# Patient Record
Sex: MALE | Race: WHITE | Employment: FULL TIME | URBAN - METROPOLITAN AREA
[De-identification: names, ages, dates, MRNs, and addresses within clinical notes are randomized per-mention and may not be internally consistent; named-entity substitution may affect disease eponyms.]

---

## 2018-03-21 ENCOUNTER — TELEPHONE (OUTPATIENT)
Dept: FAMILY MEDICINE CLINIC | Age: 33
End: 2018-03-21

## 2018-03-21 ENCOUNTER — OFFICE VISIT (OUTPATIENT)
Dept: FAMILY MEDICINE CLINIC | Age: 33
End: 2018-03-21

## 2018-03-21 VITALS
DIASTOLIC BLOOD PRESSURE: 66 MMHG | HEIGHT: 70 IN | WEIGHT: 183 LBS | BODY MASS INDEX: 26.2 KG/M2 | SYSTOLIC BLOOD PRESSURE: 122 MMHG

## 2018-03-21 DIAGNOSIS — R73.01 IFG (IMPAIRED FASTING GLUCOSE): ICD-10-CM

## 2018-03-21 DIAGNOSIS — L30.9 ECZEMA, UNSPECIFIED TYPE: Primary | ICD-10-CM

## 2018-03-21 DIAGNOSIS — E78.5 DYSLIPIDEMIA: ICD-10-CM

## 2018-03-21 DIAGNOSIS — R53.83 FATIGUE, UNSPECIFIED TYPE: ICD-10-CM

## 2018-03-21 PROCEDURE — 99213 OFFICE O/P EST LOW 20 MIN: CPT | Performed by: FAMILY MEDICINE

## 2018-03-21 PROCEDURE — 96372 THER/PROPH/DIAG INJ SC/IM: CPT | Performed by: FAMILY MEDICINE

## 2018-03-21 RX ORDER — METHYLPREDNISOLONE 4 MG/1
TABLET ORAL
Qty: 1 KIT | Refills: 0 | Status: SHIPPED | OUTPATIENT
Start: 2018-03-21 | End: 2018-03-27

## 2018-03-21 RX ORDER — DEXAMETHASONE SODIUM PHOSPHATE 4 MG/ML
4 INJECTION, SOLUTION INTRA-ARTICULAR; INTRALESIONAL; INTRAMUSCULAR; INTRAVENOUS; SOFT TISSUE ONCE
Status: COMPLETED | OUTPATIENT
Start: 2018-03-21 | End: 2018-03-21

## 2018-03-21 RX ADMIN — DEXAMETHASONE SODIUM PHOSPHATE 4 MG: 4 INJECTION, SOLUTION INTRA-ARTICULAR; INTRALESIONAL; INTRAMUSCULAR; INTRAVENOUS; SOFT TISSUE at 15:23

## 2018-03-21 ASSESSMENT — ENCOUNTER SYMPTOMS
CONSTIPATION: 0
DOUBLE VISION: 0
BLOOD IN STOOL: 0
HEARTBURN: 0
HEMOPTYSIS: 0
STRIDOR: 0
EYES NEGATIVE: 1
BLURRED VISION: 0
NAUSEA: 0
PHOTOPHOBIA: 0
EYE REDNESS: 0
SINUS PAIN: 0
BACK PAIN: 0
SPUTUM PRODUCTION: 0
WHEEZING: 0
GASTROINTESTINAL NEGATIVE: 1
ORTHOPNEA: 0
RESPIRATORY NEGATIVE: 1
ABDOMINAL PAIN: 0
EYE DISCHARGE: 0

## 2018-03-22 ENCOUNTER — NURSE ONLY (OUTPATIENT)
Dept: FAMILY MEDICINE CLINIC | Age: 33
End: 2018-03-22
Payer: COMMERCIAL

## 2018-03-22 ENCOUNTER — HOSPITAL ENCOUNTER (OUTPATIENT)
Age: 33
Discharge: HOME | End: 2018-03-24
Payer: COMMERCIAL

## 2018-03-22 DIAGNOSIS — Z87.898 HISTORY OF SYNCOPE: ICD-10-CM

## 2018-03-22 DIAGNOSIS — R73.01 IFG (IMPAIRED FASTING GLUCOSE): ICD-10-CM

## 2018-03-22 DIAGNOSIS — E78.5 DYSLIPIDEMIA: ICD-10-CM

## 2018-03-22 DIAGNOSIS — R53.83 FATIGUE, UNSPECIFIED TYPE: ICD-10-CM

## 2018-03-22 LAB
ALBUMIN SERPL-MCNC: 4.6 G/DL (ref 3.5–5.2)
ALP BLD-CCNC: 50 U/L (ref 40–129)
ALT SERPL-CCNC: 19 U/L (ref 0–40)
ANION GAP SERPL CALCULATED.3IONS-SCNC: 11 MMOL/L (ref 7–16)
AST SERPL-CCNC: 17 U/L (ref 0–39)
BASOPHILS ABSOLUTE: 0.04 E9/L (ref 0–0.2)
BASOPHILS RELATIVE PERCENT: 0.3 % (ref 0–2)
BILIRUB SERPL-MCNC: 0.5 MG/DL (ref 0–1.2)
BUN BLDV-MCNC: 12 MG/DL (ref 6–20)
CALCIUM SERPL-MCNC: 9.4 MG/DL (ref 8.6–10.2)
CHLORIDE BLD-SCNC: 96 MMOL/L (ref 98–107)
CHOLESTEROL, TOTAL: 189 MG/DL (ref 0–199)
CO2: 30 MMOL/L (ref 22–29)
CREAT SERPL-MCNC: 0.8 MG/DL (ref 0.7–1.2)
EOSINOPHILS ABSOLUTE: 0.07 E9/L (ref 0.05–0.5)
EOSINOPHILS RELATIVE PERCENT: 0.6 % (ref 0–6)
GFR AFRICAN AMERICAN: >60
GFR NON-AFRICAN AMERICAN: >60 ML/MIN/1.73
GLUCOSE BLD-MCNC: 83 MG/DL (ref 74–109)
HBA1C MFR BLD: 5.2 % (ref 4.8–5.9)
HCT VFR BLD CALC: 44.6 % (ref 37–54)
HDLC SERPL-MCNC: 58 MG/DL
HEMOGLOBIN: 14.9 G/DL (ref 12.5–16.5)
IMMATURE GRANULOCYTES #: 0.02 E9/L
IMMATURE GRANULOCYTES %: 0.2 % (ref 0–5)
LDL CHOLESTEROL CALCULATED: 116 MG/DL (ref 0–99)
LYMPHOCYTES ABSOLUTE: 2.48 E9/L (ref 1.5–4)
LYMPHOCYTES RELATIVE PERCENT: 19.5 % (ref 20–42)
MCH RBC QN AUTO: 30.2 PG (ref 26–35)
MCHC RBC AUTO-ENTMCNC: 33.4 % (ref 32–34.5)
MCV RBC AUTO: 90.5 FL (ref 80–99.9)
MONOCYTES ABSOLUTE: 0.78 E9/L (ref 0.1–0.95)
MONOCYTES RELATIVE PERCENT: 6.1 % (ref 2–12)
NEUTROPHILS ABSOLUTE: 9.31 E9/L (ref 1.8–7.3)
NEUTROPHILS RELATIVE PERCENT: 73.3 % (ref 43–80)
PDW BLD-RTO: 12.6 FL (ref 11.5–15)
PLATELET # BLD: 290 E9/L (ref 130–450)
PMV BLD AUTO: 11.3 FL (ref 7–12)
POTASSIUM SERPL-SCNC: 3.6 MMOL/L (ref 3.5–5)
RBC # BLD: 4.93 E12/L (ref 3.8–5.8)
SODIUM BLD-SCNC: 137 MMOL/L (ref 132–146)
TOTAL PROTEIN: 7.7 G/DL (ref 6.4–8.3)
TRIGL SERPL-MCNC: 77 MG/DL (ref 0–149)
TSH SERPL DL<=0.05 MIU/L-ACNC: 0.79 UIU/ML (ref 0.27–4.2)
VLDLC SERPL CALC-MCNC: 15 MG/DL
WBC # BLD: 12.7 E9/L (ref 4.5–11.5)

## 2018-03-22 PROCEDURE — 80053 COMPREHEN METABOLIC PANEL: CPT

## 2018-03-22 PROCEDURE — 83036 HEMOGLOBIN GLYCOSYLATED A1C: CPT

## 2018-03-22 PROCEDURE — 36415 COLL VENOUS BLD VENIPUNCTURE: CPT | Performed by: FAMILY MEDICINE

## 2018-03-22 PROCEDURE — 84443 ASSAY THYROID STIM HORMONE: CPT

## 2018-03-22 PROCEDURE — 85025 COMPLETE CBC W/AUTO DIFF WBC: CPT

## 2018-03-22 PROCEDURE — 80061 LIPID PANEL: CPT

## 2018-03-22 ASSESSMENT — PATIENT HEALTH QUESTIONNAIRE - PHQ9
2. FEELING DOWN, DEPRESSED OR HOPELESS: 0
1. LITTLE INTEREST OR PLEASURE IN DOING THINGS: 0
SUM OF ALL RESPONSES TO PHQ QUESTIONS 1-9: 0
SUM OF ALL RESPONSES TO PHQ9 QUESTIONS 1 & 2: 0

## 2018-03-22 ASSESSMENT — ENCOUNTER SYMPTOMS
TROUBLE SWALLOWING: 0
VISUAL CHANGE: 0

## 2018-04-18 ENCOUNTER — OFFICE VISIT (OUTPATIENT)
Dept: FAMILY MEDICINE CLINIC | Age: 33
End: 2018-04-18
Payer: COMMERCIAL

## 2018-04-18 VITALS
HEART RATE: 76 BPM | BODY MASS INDEX: 26.2 KG/M2 | SYSTOLIC BLOOD PRESSURE: 116 MMHG | DIASTOLIC BLOOD PRESSURE: 72 MMHG | OXYGEN SATURATION: 98 % | HEIGHT: 70 IN | WEIGHT: 183 LBS

## 2018-04-18 DIAGNOSIS — L30.9 ECZEMA, UNSPECIFIED TYPE: ICD-10-CM

## 2018-04-18 DIAGNOSIS — M67.40 GANGLION: ICD-10-CM

## 2018-04-18 DIAGNOSIS — E78.5 DYSLIPIDEMIA: Primary | ICD-10-CM

## 2018-04-18 PROCEDURE — 99213 OFFICE O/P EST LOW 20 MIN: CPT | Performed by: FAMILY MEDICINE

## 2018-04-18 ASSESSMENT — ENCOUNTER SYMPTOMS
SORE THROAT: 0
BLOOD IN STOOL: 0
COUGH: 0
BLURRED VISION: 0
HEARTBURN: 0
DOUBLE VISION: 0
SPUTUM PRODUCTION: 0
GASTROINTESTINAL NEGATIVE: 1
SINUS PAIN: 0
ABDOMINAL PAIN: 0
DIARRHEA: 0
RESPIRATORY NEGATIVE: 1
SHORTNESS OF BREATH: 0
EYES NEGATIVE: 1
EYE PAIN: 0
NAUSEA: 0
CONSTIPATION: 0
VOMITING: 0
HEMOPTYSIS: 0
BACK PAIN: 0
STRIDOR: 0
EYE DISCHARGE: 0
EYE REDNESS: 0
WHEEZING: 0
PHOTOPHOBIA: 0
ORTHOPNEA: 0

## 2021-04-01 ENCOUNTER — IMMUNIZATION (OUTPATIENT)
Dept: PRIMARY CARE CLINIC | Age: 36
End: 2021-04-01
Payer: COMMERCIAL

## 2021-04-01 PROCEDURE — 0011A COVID-19, MODERNA VACCINE 100MCG/0.5ML DOSE: CPT | Performed by: NURSE PRACTITIONER

## 2021-04-01 PROCEDURE — 91301 COVID-19, MODERNA VACCINE 100MCG/0.5ML DOSE: CPT | Performed by: NURSE PRACTITIONER

## 2021-04-29 ENCOUNTER — IMMUNIZATION (OUTPATIENT)
Dept: PRIMARY CARE CLINIC | Age: 36
End: 2021-04-29
Payer: COMMERCIAL

## 2021-04-29 PROCEDURE — 91301 COVID-19, MODERNA VACCINE 100MCG/0.5ML DOSE: CPT | Performed by: NURSE PRACTITIONER

## 2021-04-29 PROCEDURE — 0012A COVID-19, MODERNA VACCINE 100MCG/0.5ML DOSE: CPT | Performed by: NURSE PRACTITIONER

## 2021-09-30 ENCOUNTER — NURSE TRIAGE (OUTPATIENT)
Dept: OTHER | Facility: CLINIC | Age: 36
End: 2021-09-30

## 2021-09-30 NOTE — TELEPHONE ENCOUNTER
Reason for Disposition   MODERATE-SEVERE eyelid swelling on one side  (Exception: due to a mosquito bite)    Answer Assessment - Initial Assessment Questions  1. ONSET: \"When did the swelling start? \" (e.g., minutes, hours, days)      Yesterday around 7 PM, felt itchy and looked in the mirror and saw what was going on    2. LOCATION: \"What part of the eyelids is swollen? \"      Upper right eyelid     3. SEVERITY: \"How swollen is it? \"      Puffy, not swollen shut    4. ITCHING: \"Is there any itching? \" If so, ask: \"How much? \"   (Scale 1-10; mild, moderate or severe)      Yes ,slightly itchy    5. PAIN: \"Is the swelling painful to touch? \" If so, ask: \"How painful is it? \"   (Scale 1-10; mild, moderate or severe)      Yes, but not bad pain,  2/10      6. FEVER: \"Do you have a fever? \" If so, ask: \"What is it, how was it measured, and when did it start? \"       Denies    7. CAUSE: \"What do you think is causing the swelling? \"      I don't know    8. RECURRENT SYMPTOM: \"Have you had eyelid swelling before? \" If so, ask: \"When was the last time? \" \"What happened that time? \"      No    9. OTHER SYMPTOMS: \"Do you have any other symptoms? \" (e.g., blurred vision, eye discharge, rash, runny nose)      Denies blurred, discharge, rash and runny nose    10. PREGNANCY: \"Is there any chance you are pregnant? \" \"When was your last menstrual period? \"        No        Wears contacts, but wearing glasses today. Protocols used: EYE - SWELLING-ADULT-AH    Received call from [address] at [facility] with Red Flag Complaint. Brief description of triage: upper right eyelid puffy since yesterday    Triage indicates for patient to be seen with in 24 hours    Care advice provided, patient verbalizes understanding; denies any other questions or concerns; instructed to call back for any new or worsening symptoms. Writer provided warm transfer to [facility] for appointment scheduling. Attention Provider:   Thank you for allowing me to participate in the care of your patient. The patient was connected to triage in response to information provided to the E[facility]. Please do not respond through this encounter as the response is not directed to a shared pool.

## 2021-10-01 ENCOUNTER — OFFICE VISIT (OUTPATIENT)
Dept: FAMILY MEDICINE CLINIC | Age: 36
End: 2021-10-01
Payer: COMMERCIAL

## 2021-10-01 VITALS
SYSTOLIC BLOOD PRESSURE: 120 MMHG | RESPIRATION RATE: 16 BRPM | BODY MASS INDEX: 27.35 KG/M2 | WEIGHT: 191 LBS | OXYGEN SATURATION: 98 % | TEMPERATURE: 97.5 F | DIASTOLIC BLOOD PRESSURE: 74 MMHG | HEART RATE: 53 BPM | HEIGHT: 70 IN

## 2021-10-01 DIAGNOSIS — M79.672 LEFT FOOT PAIN: ICD-10-CM

## 2021-10-01 DIAGNOSIS — Z00.00 PHYSICAL EXAM: ICD-10-CM

## 2021-10-01 DIAGNOSIS — H10.9 CONJUNCTIVITIS OF RIGHT EYE, UNSPECIFIED CONJUNCTIVITIS TYPE: Primary | ICD-10-CM

## 2021-10-01 PROCEDURE — 99214 OFFICE O/P EST MOD 30 MIN: CPT | Performed by: FAMILY MEDICINE

## 2021-10-01 RX ORDER — TOBRAMYCIN AND DEXAMETHASONE 3; 1 MG/ML; MG/ML
1 SUSPENSION/ DROPS OPHTHALMIC
Qty: 5 ML | Refills: 0 | Status: SHIPPED | OUTPATIENT
Start: 2021-10-01 | End: 2021-10-21

## 2021-10-01 ASSESSMENT — PATIENT HEALTH QUESTIONNAIRE - PHQ9
SUM OF ALL RESPONSES TO PHQ QUESTIONS 1-9: 0
SUM OF ALL RESPONSES TO PHQ QUESTIONS 1-9: 0
2. FEELING DOWN, DEPRESSED OR HOPELESS: 0
SUM OF ALL RESPONSES TO PHQ9 QUESTIONS 1 & 2: 0
1. LITTLE INTEREST OR PLEASURE IN DOING THINGS: 0
SUM OF ALL RESPONSES TO PHQ QUESTIONS 1-9: 0

## 2021-10-07 PROBLEM — M79.672 LEFT FOOT PAIN: Status: ACTIVE | Noted: 2021-10-07

## 2021-10-07 PROBLEM — H10.9 CONJUNCTIVITIS OF RIGHT EYE: Status: ACTIVE | Noted: 2021-10-07

## 2021-10-07 ASSESSMENT — ENCOUNTER SYMPTOMS
EYE REDNESS: 0
CHEST TIGHTNESS: 0
SORE THROAT: 0
COLOR CHANGE: 0
VOICE CHANGE: 0
NAUSEA: 0
GASTROINTESTINAL NEGATIVE: 1
BLOOD IN STOOL: 0
RECTAL PAIN: 0
CHOKING: 0
SHORTNESS OF BREATH: 0
DIARRHEA: 0
BACK PAIN: 0
EYE DISCHARGE: 0
STRIDOR: 0
PHOTOPHOBIA: 0
SINUS PRESSURE: 0
CONSTIPATION: 0
FACIAL SWELLING: 0
EYE ITCHING: 1
RHINORRHEA: 0
WHEEZING: 0
VOMITING: 0
ANAL BLEEDING: 0
EYE PAIN: 0
COUGH: 0
RESPIRATORY NEGATIVE: 1
TROUBLE SWALLOWING: 0
SINUS PAIN: 0
ABDOMINAL PAIN: 0
ABDOMINAL DISTENTION: 0

## 2021-10-07 NOTE — PROGRESS NOTES
SUBJECTIVE  Yolanda Garsia is a 43 y.o. male. HPI/Chief C/O:  Chief Complaint   Patient presents with    Eye Problem     Right eye is swollen x 2 days. \"A little itchy. \" No discharge from eye.  Foot Pain     Left foot pain x 2-3 months. No injury recalled. No Known Allergies    This 43year old male presents for physical exam. Pt has alin. Eyelid swelling with itchy eyes for 2 days. Pt states vision is good. Pt c/o left foot pain, denies injury, for 3 months. ROS:  Review of Systems   Constitutional: Positive for fatigue. Negative for activity change, appetite change, chills, diaphoresis, fever and unexpected weight change. HENT: Negative for congestion, dental problem, drooling, ear discharge, ear pain, facial swelling, hearing loss, mouth sores, nosebleeds, postnasal drip, rhinorrhea, sinus pressure, sinus pain, sneezing, sore throat, tinnitus, trouble swallowing and voice change. Eyes: Positive for itching. Negative for photophobia, pain, discharge, redness and visual disturbance. Respiratory: Negative. Negative for cough, choking, chest tightness, shortness of breath, wheezing and stridor. Cardiovascular: Negative for chest pain, palpitations and leg swelling. Gastrointestinal: Negative. Negative for abdominal distention, abdominal pain, anal bleeding, blood in stool, constipation, diarrhea, nausea, rectal pain and vomiting. Endocrine: Negative. Negative for cold intolerance, heat intolerance, polydipsia, polyphagia and polyuria. Genitourinary: Negative. Negative for decreased urine volume, difficulty urinating, dysuria, flank pain, frequency, hematuria and urgency. Musculoskeletal: Positive for arthralgias and myalgias. Negative for back pain, gait problem, joint swelling, neck pain and neck stiffness. Skin: Negative. Negative for color change, pallor, rash and wound. Neurological: Negative.   Negative for dizziness, tremors, seizures, syncope, facial asymmetry, speech rhythm. Pulses: Normal pulses. Heart sounds: Normal heart sounds. No murmur heard. No friction rub. No gallop. Pulmonary:      Effort: Pulmonary effort is normal. No respiratory distress. Breath sounds: Normal breath sounds. No stridor. No wheezing, rhonchi or rales. Chest:      Chest wall: No tenderness. Abdominal:      General: Bowel sounds are normal. There is no distension. Palpations: Abdomen is soft. There is no mass. Tenderness: There is no abdominal tenderness. There is no right CVA tenderness, left CVA tenderness, guarding or rebound. Hernia: No hernia is present. Musculoskeletal:         General: Tenderness present. No swelling, deformity or signs of injury. Cervical back: Normal range of motion and neck supple. No rigidity or tenderness. No muscular tenderness. Right lower leg: No edema. Left lower leg: No edema. Comments: Pain and decreased ROM left foot. Lymphadenopathy:      Cervical: No cervical adenopathy. Skin:     General: Skin is warm. Coloration: Skin is not jaundiced or pale. Findings: No bruising, erythema, lesion or rash. Neurological:      General: No focal deficit present. Mental Status: He is alert and oriented to person, place, and time. Cranial Nerves: No cranial nerve deficit. Sensory: No sensory deficit. Motor: No weakness or abnormal muscle tone. Coordination: Coordination normal.      Gait: Gait normal.      Deep Tendon Reflexes: Reflexes are normal and symmetric. Reflexes normal.   Psychiatric:         Mood and Affect: Mood normal.         Behavior: Behavior normal.         Thought Content: Thought content normal.         Judgment: Judgment normal.         ASSESSMENT/PLAN  Allyson Valdes was seen today for eye problem and foot pain. Diagnoses and all orders for this visit:    Conjunctivitis of right eye, unspecified conjunctivitis type  Not controlled. tobradex ophthalmic.  Pt

## 2021-12-03 ENCOUNTER — HOSPITAL ENCOUNTER (EMERGENCY)
Age: 36
Discharge: HOME | End: 2021-12-03
Payer: COMMERCIAL

## 2021-12-03 VITALS
DIASTOLIC BLOOD PRESSURE: 76 MMHG | HEART RATE: 63 BPM | RESPIRATION RATE: 14 BRPM | SYSTOLIC BLOOD PRESSURE: 129 MMHG | TEMPERATURE: 97.5 F | OXYGEN SATURATION: 100 %

## 2021-12-03 DIAGNOSIS — S61.219A LACERATION OF FINGER WITHOUT FOREIGN BODY WITHOUT DAMAGE TO NAIL, UNSPECIFIED FINGER, UNSPECIFIED LATERALITY, INITIAL ENCOUNTER: Primary | ICD-10-CM

## 2021-12-03 PROCEDURE — 99211 OFF/OP EST MAY X REQ PHY/QHP: CPT

## 2021-12-03 PROCEDURE — 12001 RPR S/N/AX/GEN/TRNK 2.5CM/<: CPT

## 2021-12-03 ASSESSMENT — PAIN DESCRIPTION - LOCATION: LOCATION: FINGER (COMMENT WHICH ONE)

## 2021-12-03 ASSESSMENT — PAIN SCALES - GENERAL: PAINLEVEL_OUTOF10: 6

## 2021-12-03 ASSESSMENT — PAIN DESCRIPTION - ORIENTATION: ORIENTATION: LEFT

## 2021-12-03 NOTE — ED PROVIDER NOTES
Department of Emergency Medicine  ED Provider Note  Admit Date: 12/3/2021  Room: 06/06            HPI:  12/3/21, Time: 3:53 PM SONIA Richards is a 43 y.o. old male presenting to the emergency department for a laceration to the left index finger. He said this happened at home when he was using a knife to cut some type of material that he was using to build a bathroom in his basement. He said it happened just prior to arrival he has no other complaints  Review of Systems:   Pertinent positives and negatives are stated within HPI, all other systems reviewed and are negative.          --------------------------------------------- PAST HISTORY ---------------------------------------------  Past Medical History:  has a past medical history of Syncope. Past Surgical History:  has no past surgical history on file. Social History:  reports that he has never smoked. He has never used smokeless tobacco.    Family History: family history is not on file. The patients home medications have been reviewed. Allergies: Patient has no known allergies. -------------------------------------------------- RESULTS -------------------------------------------------  All laboratory and radiology results have been personally reviewed by myself   LABS:  No results found for this visit on 12/03/21. RADIOLOGY:  Interpreted by Radiologist.  No orders to display       ------------------------- NURSING NOTES AND VITALS REVIEWED ---------------------------   The nursing notes within the ED encounter and vital signs as below have been reviewed.    /76   Pulse 63   Temp 97.5 °F (36.4 °C)   Resp 14   SpO2 100%   Oxygen Saturation Interpretation: Normal      ---------------------------------------------------PHYSICAL EXAM--------------------------------------      Constitutional/General: Alert and oriented x3, well appearing, non toxic in NAD  Head: Normocephalic and atraumatic  Eyes: clear conjunctiva  Mouth:  handling secretions, no trismus  Neck: Supple, full ROM,   Pulmonary: . Not in respiratory distress  Cardiovascular:  Regular rate and rhythm,   Extremities: Moves all extremities x 4. Warm and well perfused  Skin: warm and dry without rash. There is a laceration of the left index finger, which measures 1 cm. It is a superficial flap type laceration on the distal part of the finger   neurologic: GCS 15,  Psych: Normal Affect      ------------------------------ ED COURSE/MEDICAL DECISION MAKING----------------------  Medications - No data to display          LACERATION REPAIR  PROCEDURE NOTE:      Medical Decision Making: This is a superficial laceration on the distal part of the finger. It is more of a flap type laceration. I did irrigate it with normal saline. I did close it with skin adhesive. He was advised to follow-up with his PCP. He thinks his tetanus shot is within the last 5 years he did not want one today he will check his records at home and follow-up with his doctor if he needs one. Counseling: The emergency provider has spoken with the patient and discussed todays results, in addition to providing specific details for the plan of care and counseling regarding the diagnosis and prognosis. Questions are answered at this time and they are agreeable with the plan.      --------------------------------- IMPRESSION AND DISPOSITION ---------------------------------    IMPRESSION  1.  Laceration of finger without foreign body without damage to nail, unspecified finger, unspecified laterality, initial encounter        DISPOSITION  Disposition: Discharge to home  Patient condition is good         RICKY Raphael - CNP  12/03/21 [address] - CNP  12/03/21 [id number]

## 2025-01-22 ENCOUNTER — OFFICE VISIT (OUTPATIENT)
Dept: FAMILY MEDICINE CLINIC | Age: 40
End: 2025-01-22
Payer: COMMERCIAL

## 2025-01-22 VITALS
SYSTOLIC BLOOD PRESSURE: 110 MMHG | OXYGEN SATURATION: 97 % | HEIGHT: 70 IN | DIASTOLIC BLOOD PRESSURE: 72 MMHG | HEART RATE: 56 BPM | BODY MASS INDEX: 28.77 KG/M2 | WEIGHT: 201 LBS | TEMPERATURE: 97.7 F | RESPIRATION RATE: 17 BRPM

## 2025-01-22 DIAGNOSIS — M54.32 SCIATICA OF LEFT SIDE: Primary | ICD-10-CM

## 2025-01-22 PROCEDURE — 99204 OFFICE O/P NEW MOD 45 MIN: CPT | Performed by: PHYSICIAN ASSISTANT

## 2025-01-22 PROCEDURE — 96372 THER/PROPH/DIAG INJ SC/IM: CPT | Performed by: PHYSICIAN ASSISTANT

## 2025-01-22 RX ORDER — PREDNISONE 10 MG/1
TABLET ORAL
Qty: 18 TABLET | Refills: 0 | Status: SHIPPED | OUTPATIENT
Start: 2025-01-22

## 2025-01-22 RX ORDER — METHYLPREDNISOLONE ACETATE 80 MG/ML
80 INJECTION, SUSPENSION INTRA-ARTICULAR; INTRALESIONAL; INTRAMUSCULAR; SOFT TISSUE ONCE
Status: COMPLETED | OUTPATIENT
Start: 2025-01-22 | End: 2025-01-22

## 2025-01-22 RX ADMIN — METHYLPREDNISOLONE ACETATE 80 MG: 80 INJECTION, SUSPENSION INTRA-ARTICULAR; INTRALESIONAL; INTRAMUSCULAR; SOFT TISSUE at 09:24

## 2025-01-22 NOTE — PROGRESS NOTES
25  Broderick Pfeiffer : 1985 Sex: male  Age 39 y.o.      Subjective:  Chief Complaint   Patient presents with    Leg Pain     Pain down left leg         HPI:     History of Present Illness  The patient presents for evaluation of knee pain.    He reports experiencing sharp, localized pain in his knee, which radiates into his calf muscle and extends up to his quadriceps. This pain is distinct from the usual muscle tension he experiences and was severe enough to disrupt his sleep at 1 AM. Despite attempts to alleviate the discomfort through the use of a tennis ball and a TENS unit, the pain persisted for approximately 3 hours. He describes the pain as being particularly intense under the kneecap. He reports no recent falls or injuries and does not experience any associated back pain. His wife observed no swelling in the affected area this morning. He reports no history of sciatica or blood clots in the legs or chest. He also reports no urinary or bowel incontinence, maintains good sensation in the groin area, and does not experience any dysuria.    Supplemental Information  He has a history of low back pain due to arthritis, for which he occasionally seeks chiropractic care. 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            ROS:   Unless otherwise stated in this report the patient's positive and negative responses for review of systems for constitutional, eyes, ENT, cardiovascular, respiratory, gastrointestinal, neurological, , musculoskeletal, and integument systems and related systems to the presenting problem are either stated in the history of present illness or were not pertinent or were negative for the symptoms and/or complaints related to the presenting medical problem.  Positives and pertinent negatives as per HPI.  All others reviewed and are negative.      PMH:     Past Medical

## 2025-02-04 ENCOUNTER — OFFICE VISIT (OUTPATIENT)
Dept: PRIMARY CARE CLINIC | Age: 40
End: 2025-02-04
Payer: COMMERCIAL

## 2025-02-04 VITALS
BODY MASS INDEX: 28.49 KG/M2 | DIASTOLIC BLOOD PRESSURE: 76 MMHG | WEIGHT: 199 LBS | HEIGHT: 70 IN | TEMPERATURE: 97.6 F | OXYGEN SATURATION: 95 % | HEART RATE: 58 BPM | SYSTOLIC BLOOD PRESSURE: 128 MMHG

## 2025-02-04 DIAGNOSIS — E66.3 OVERWEIGHT WITH BODY MASS INDEX (BMI) OF 28 TO 28.9 IN ADULT: ICD-10-CM

## 2025-02-04 DIAGNOSIS — Z13.6 SCREENING FOR CARDIOVASCULAR CONDITION: ICD-10-CM

## 2025-02-04 DIAGNOSIS — Z00.00 WELLNESS EXAMINATION: Primary | ICD-10-CM

## 2025-02-04 DIAGNOSIS — E78.5 DYSLIPIDEMIA: ICD-10-CM

## 2025-02-04 DIAGNOSIS — R53.83 OTHER FATIGUE: ICD-10-CM

## 2025-02-04 PROBLEM — M79.672 LEFT FOOT PAIN: Status: RESOLVED | Noted: 2021-10-07 | Resolved: 2025-02-04

## 2025-02-04 PROBLEM — H10.9 CONJUNCTIVITIS OF RIGHT EYE: Status: RESOLVED | Noted: 2021-10-07 | Resolved: 2025-02-04

## 2025-02-04 PROBLEM — M67.40 GANGLION: Status: RESOLVED | Noted: 2018-04-18 | Resolved: 2025-02-04

## 2025-02-04 PROCEDURE — 99396 PREV VISIT EST AGE 40-64: CPT | Performed by: STUDENT IN AN ORGANIZED HEALTH CARE EDUCATION/TRAINING PROGRAM

## 2025-02-04 SDOH — ECONOMIC STABILITY: FOOD INSECURITY: WITHIN THE PAST 12 MONTHS, YOU WORRIED THAT YOUR FOOD WOULD RUN OUT BEFORE YOU GOT MONEY TO BUY MORE.: NEVER TRUE

## 2025-02-04 SDOH — ECONOMIC STABILITY: FOOD INSECURITY: WITHIN THE PAST 12 MONTHS, THE FOOD YOU BOUGHT JUST DIDN'T LAST AND YOU DIDN'T HAVE MONEY TO GET MORE.: NEVER TRUE

## 2025-02-04 ASSESSMENT — ENCOUNTER SYMPTOMS
SHORTNESS OF BREATH: 0
WHEEZING: 0
VOMITING: 0
NAUSEA: 0
DIARRHEA: 0
ABDOMINAL PAIN: 0
COUGH: 0

## 2025-02-04 ASSESSMENT — PATIENT HEALTH QUESTIONNAIRE - PHQ9
2. FEELING DOWN, DEPRESSED OR HOPELESS: NOT AT ALL
SUM OF ALL RESPONSES TO PHQ QUESTIONS 1-9: 0
1. LITTLE INTEREST OR PLEASURE IN DOING THINGS: NOT AT ALL
SUM OF ALL RESPONSES TO PHQ QUESTIONS 1-9: 0
SUM OF ALL RESPONSES TO PHQ9 QUESTIONS 1 & 2: 0

## 2025-02-04 NOTE — PROGRESS NOTES
entered as intended.          An electronic signature was used to authenticate this note.    --Maritza Zuniga, DO

## 2025-02-04 NOTE — ASSESSMENT & PLAN NOTE
Ordered labwork     Orders:    CBC with Auto Differential; Future    Comprehensive Metabolic Panel; Future    Hemoglobin A1C; Future    TSH; Future    Lipid Panel; Future

## 2025-02-05 DIAGNOSIS — R53.83 OTHER FATIGUE: ICD-10-CM

## 2025-02-05 DIAGNOSIS — Z00.00 WELLNESS EXAMINATION: ICD-10-CM

## 2025-02-05 DIAGNOSIS — E78.5 DYSLIPIDEMIA: ICD-10-CM

## 2025-02-05 DIAGNOSIS — Z13.6 SCREENING FOR CARDIOVASCULAR CONDITION: ICD-10-CM

## 2025-02-05 DIAGNOSIS — E66.3 OVERWEIGHT WITH BODY MASS INDEX (BMI) OF 28 TO 28.9 IN ADULT: ICD-10-CM

## 2025-02-05 LAB
ALBUMIN: 4.1 G/DL (ref 3.5–5.2)
ALP BLD-CCNC: 56 U/L (ref 40–129)
ALT SERPL-CCNC: 13 U/L (ref 0–40)
ANION GAP SERPL CALCULATED.3IONS-SCNC: 16 MMOL/L (ref 7–16)
AST SERPL-CCNC: 18 U/L (ref 0–39)
BASOPHILS ABSOLUTE: 0.07 K/UL (ref 0–0.2)
BASOPHILS RELATIVE PERCENT: 1 % (ref 0–2)
BILIRUB SERPL-MCNC: 0.4 MG/DL (ref 0–1.2)
BUN BLDV-MCNC: 15 MG/DL (ref 6–20)
CALCIUM SERPL-MCNC: 9.3 MG/DL (ref 8.6–10.2)
CHLORIDE BLD-SCNC: 103 MMOL/L (ref 98–107)
CHOLESTEROL, TOTAL: 192 MG/DL
CO2: 22 MMOL/L (ref 22–29)
CREAT SERPL-MCNC: 0.9 MG/DL (ref 0.7–1.2)
EOSINOPHILS ABSOLUTE: 0.18 K/UL (ref 0.05–0.5)
EOSINOPHILS RELATIVE PERCENT: 3 % (ref 0–6)
FOLATE: 11.7 NG/ML (ref 4.8–24.2)
GFR, ESTIMATED: >90 ML/MIN/1.73M2
GLUCOSE BLD-MCNC: 86 MG/DL (ref 74–99)
HBA1C MFR BLD: 5.3 % (ref 4–5.6)
HCT VFR BLD CALC: 44.9 % (ref 37–54)
HDLC SERPL-MCNC: 41 MG/DL
HEMOGLOBIN: 14.6 G/DL (ref 12.5–16.5)
IMMATURE GRANULOCYTES %: 0 % (ref 0–5)
IMMATURE GRANULOCYTES ABSOLUTE: <0.03 K/UL (ref 0–0.58)
LDL CHOLESTEROL: 127 MG/DL
LYMPHOCYTES ABSOLUTE: 1.7 K/UL (ref 1.5–4)
LYMPHOCYTES RELATIVE PERCENT: 26 % (ref 20–42)
MCH RBC QN AUTO: 30.4 PG (ref 26–35)
MCHC RBC AUTO-ENTMCNC: 32.5 G/DL (ref 32–34.5)
MCV RBC AUTO: 93.3 FL (ref 80–99.9)
MONOCYTES ABSOLUTE: 0.47 K/UL (ref 0.1–0.95)
MONOCYTES RELATIVE PERCENT: 7 % (ref 2–12)
NEUTROPHILS ABSOLUTE: 4.2 K/UL (ref 1.8–7.3)
NEUTROPHILS RELATIVE PERCENT: 63 % (ref 43–80)
PDW BLD-RTO: 13.2 % (ref 11.5–15)
PLATELET # BLD: 291 K/UL (ref 130–450)
PMV BLD AUTO: 11 FL (ref 7–12)
POTASSIUM SERPL-SCNC: 4.5 MMOL/L (ref 3.5–5)
RBC # BLD: 4.81 M/UL (ref 3.8–5.8)
SODIUM BLD-SCNC: 141 MMOL/L (ref 132–146)
TOTAL PROTEIN: 6.9 G/DL (ref 6.4–8.3)
TRIGL SERPL-MCNC: 119 MG/DL
TSH SERPL DL<=0.05 MIU/L-ACNC: 1.54 UIU/ML (ref 0.27–4.2)
VITAMIN B-12: 574 PG/ML (ref 211–946)
VLDLC SERPL CALC-MCNC: 24 MG/DL
WBC # BLD: 6.6 K/UL (ref 4.5–11.5)

## 2025-02-06 DIAGNOSIS — Z79.899 LONG TERM USE OF DRUG: ICD-10-CM

## 2025-02-06 DIAGNOSIS — E78.5 DYSLIPIDEMIA: ICD-10-CM

## 2025-02-06 DIAGNOSIS — E66.3 OVERWEIGHT WITH BODY MASS INDEX (BMI) OF 28 TO 28.9 IN ADULT: Primary | ICD-10-CM

## 2025-02-06 LAB
6-MONOACETYLMORPHINE, URINE: NEGATIVE
ABNORMAL SPECIMEN VALIDITY TEST: ABNORMAL
ALCOHOL URINE: NOT DETECTED MG/DL
AMPHETAMINE SCREEN URINE: NEGATIVE
BARBITURATE SCREEN URINE: NEGATIVE
BENZODIAZEPINE SCREEN, URINE: NEGATIVE
BUPRENORPHINE URINE: NEGATIVE
CANNABINOID SCREEN URINE: NEGATIVE
COCAINE METABOLITE, URINE: NEGATIVE
FENTANYL URINE: NEGATIVE
INTEGRITY CHECK, CREATININE, URINE: 20.5 MG/DL (ref 22–250)
INTEGRITY CHECK, OXIDANT, URINE: 50 MG/L
INTEGRITY CHECK, PH, URINE: 7.1 (ref 4.5–9)
INTEGRITY CHECK, SPECIFIC GRAVITY, URINE: 1 (ref 1–1.03)
METHADONE SCREEN, URINE: NEGATIVE
OPIATES, URINE: NEGATIVE
OXYCODONE SCREEN URINE: NEGATIVE
PCP,URINE: NEGATIVE
SEX HORMONE BINDING GLOBULIN: 18 NMOL/L (ref 10–60)
TEST INFORMATION: ABNORMAL
TESTOSTERONE FREE-NONMALE: 89.9 PG/ML (ref 47–244)
TESTOSTERONE TOTAL: 334 NG/DL (ref 249–836)
TESTOSTERONE, BIOAVAILABLE: 210.6 NG/DL (ref 130–680)
TRAMADOL, URINE: NEGATIVE

## 2025-02-06 RX ORDER — PHENTERMINE HYDROCHLORIDE 37.5 MG/1
37.5 TABLET ORAL
Qty: 30 TABLET | Refills: 0 | Status: SHIPPED | OUTPATIENT
Start: 2025-02-06 | End: 2025-03-08

## 2025-02-28 DIAGNOSIS — E66.3 OVERWEIGHT WITH BODY MASS INDEX (BMI) OF 28 TO 28.9 IN ADULT: ICD-10-CM

## 2025-02-28 DIAGNOSIS — E78.5 DYSLIPIDEMIA: ICD-10-CM

## 2025-02-28 RX ORDER — PHENTERMINE HYDROCHLORIDE 37.5 MG/1
37.5 TABLET ORAL
Qty: 30 TABLET | Refills: 0 | Status: SHIPPED | OUTPATIENT
Start: 2025-03-04 | End: 2025-04-03

## 2025-02-28 NOTE — TELEPHONE ENCOUNTER
Name of Medication(s) Requested:  Requested Prescriptions     Pending Prescriptions Disp Refills    phentermine (ADIPEX-P) 37.5 MG tablet 30 tablet 0     Sig: Take 1 tablet by mouth every morning (before breakfast) for 30 days. Max Daily Amount: 37.5 mg       Medication is on current medication list Yes    Dosage and directions were verified? Yes    Quantity verified: 30 day supply     Pharmacy Verified?  Yes    Last Appointment:  2/4/2025    Future appts:  Future Appointments   Date Time Provider Department Center   3/13/2025  7:30 AM Maritza Zuniga DO [facility] DE[facility]        (If no appt send self scheduling link. .REFILLAPPT)  Scheduling request sent?     [] Yes  [] No    Does patient need updated?  [] Yes  [] No

## 2025-03-13 ENCOUNTER — OFFICE VISIT (OUTPATIENT)
Dept: PRIMARY CARE CLINIC | Age: 40
End: 2025-03-13
Payer: COMMERCIAL

## 2025-03-13 VITALS
TEMPERATURE: 97.7 F | WEIGHT: 188.6 LBS | BODY MASS INDEX: 27 KG/M2 | HEART RATE: 92 BPM | DIASTOLIC BLOOD PRESSURE: 82 MMHG | HEIGHT: 70 IN | OXYGEN SATURATION: 98 % | SYSTOLIC BLOOD PRESSURE: 112 MMHG

## 2025-03-13 DIAGNOSIS — E66.3 OVERWEIGHT (BMI 25.0-29.9): ICD-10-CM

## 2025-03-13 DIAGNOSIS — E78.5 DYSLIPIDEMIA: Primary | ICD-10-CM

## 2025-03-13 PROCEDURE — 99213 OFFICE O/P EST LOW 20 MIN: CPT | Performed by: STUDENT IN AN ORGANIZED HEALTH CARE EDUCATION/TRAINING PROGRAM

## 2025-03-13 ASSESSMENT — ENCOUNTER SYMPTOMS
VOMITING: 0
NAUSEA: 0
ABDOMINAL PAIN: 0
SHORTNESS OF BREATH: 0
DIARRHEA: 0
WHEEZING: 0
COUGH: 0

## 2025-03-13 NOTE — ASSESSMENT & PLAN NOTE
Patient has done very well on Adipex.  He is tolerated it well.  He has lost 10 pounds since starting the medication.  Continue current regimen and follow-up in 1 month.  Patient is aware of all the risk and benefits of this medication.  Sent medication to pharmacy.  Reviewed labs.  Patient also mention that he gets sick often and I recommended patient to consider taking vitamin C over-the-counter on a regular basis.

## 2025-03-13 NOTE — PROGRESS NOTES
Broderick Pfeiffer (:  1985) is a 39 y.o. male,Established patient, here for evaluation of the following chief complaint(s):  1 Month Follow-Up (Medication follow up)    Subjective     HPI  39-year-old male presents for follow-up of weight loss medication.  At previous visit in February we had lab work done and patient desired to go on a weight loss medication Adipex.  Patient has lost 10 pounds since starting Adipex.  At previous visit he was 199 pounds he is now 188 pounds. Pt has been little jittery on the medication but no other side effects.  He overall feels well on the medication  Review of Systems   Constitutional:  Negative for activity change, appetite change, fatigue and fever.   Respiratory:  Negative for cough, shortness of breath and wheezing.    Cardiovascular:  Negative for chest pain and palpitations.   Gastrointestinal:  Negative for abdominal pain, diarrhea, nausea and vomiting.   Skin:  Negative for pallor.   Neurological:  Negative for dizziness, weakness and headaches.          Objective    /82   Pulse 92   Temp 97.7 °F (36.5 °C)   Ht 1.765 m (5' 9.5\")   Wt 85.5 kg (188 lb 9.6 oz)   SpO2 98%   BMI 27.45 kg/m²   Physical Exam  Constitutional:       Appearance: He is well-developed and well-groomed.   HENT:      Right Ear: Tympanic membrane and ear canal normal. Tympanic membrane is not erythematous.      Left Ear: Tympanic membrane and ear canal normal. Tympanic membrane is not erythematous.      Mouth/Throat:      Mouth: Mucous membranes are moist.      Pharynx: Oropharynx is clear.   Eyes:      General: No scleral icterus.     Pupils: Pupils are equal, round, and reactive to light.   Cardiovascular:      Rate and Rhythm: Normal rate and regular rhythm.      Heart sounds: No murmur heard.  Pulmonary:      Effort: No retractions.      Breath sounds: No decreased breath sounds, wheezing or rhonchi.      Comments: CTAB, good air exchange  Musculoskeletal:      Right lower

## 2025-03-27 DIAGNOSIS — E78.5 DYSLIPIDEMIA: ICD-10-CM

## 2025-03-27 DIAGNOSIS — E66.3 OVERWEIGHT (BMI 25.0-29.9): Primary | ICD-10-CM

## 2025-03-27 RX ORDER — PHENTERMINE HYDROCHLORIDE 37.5 MG/1
37.5 CAPSULE ORAL EVERY MORNING
Qty: 30 CAPSULE | Refills: 0 | Status: SHIPPED | OUTPATIENT
Start: 2025-04-04 | End: 2025-05-04

## 2025-04-08 ENCOUNTER — OFFICE VISIT (OUTPATIENT)
Dept: PRIMARY CARE CLINIC | Age: 40
End: 2025-04-08
Payer: COMMERCIAL

## 2025-04-08 VITALS
RESPIRATION RATE: 16 BRPM | OXYGEN SATURATION: 98 % | DIASTOLIC BLOOD PRESSURE: 70 MMHG | SYSTOLIC BLOOD PRESSURE: 106 MMHG | WEIGHT: 186 LBS | HEART RATE: 91 BPM | HEIGHT: 70 IN | TEMPERATURE: 97.6 F | BODY MASS INDEX: 26.63 KG/M2

## 2025-04-08 DIAGNOSIS — E66.3 OVERWEIGHT (BMI 25.0-29.9): Primary | ICD-10-CM

## 2025-04-08 DIAGNOSIS — E78.5 DYSLIPIDEMIA: ICD-10-CM

## 2025-04-08 PROCEDURE — 99213 OFFICE O/P EST LOW 20 MIN: CPT | Performed by: STUDENT IN AN ORGANIZED HEALTH CARE EDUCATION/TRAINING PROGRAM

## 2025-04-08 SDOH — ECONOMIC STABILITY: FOOD INSECURITY: WITHIN THE PAST 12 MONTHS, YOU WORRIED THAT YOUR FOOD WOULD RUN OUT BEFORE YOU GOT MONEY TO BUY MORE.: NEVER TRUE

## 2025-04-08 SDOH — ECONOMIC STABILITY: INCOME INSECURITY: IN THE LAST 12 MONTHS, WAS THERE A TIME WHEN YOU WERE NOT ABLE TO PAY THE MORTGAGE OR RENT ON TIME?: NO

## 2025-04-08 SDOH — ECONOMIC STABILITY: FOOD INSECURITY: WITHIN THE PAST 12 MONTHS, THE FOOD YOU BOUGHT JUST DIDN'T LAST AND YOU DIDN'T HAVE MONEY TO GET MORE.: NEVER TRUE

## 2025-04-08 SDOH — ECONOMIC STABILITY: TRANSPORTATION INSECURITY
IN THE PAST 12 MONTHS, HAS THE LACK OF TRANSPORTATION KEPT YOU FROM MEDICAL APPOINTMENTS OR FROM GETTING MEDICATIONS?: NO

## 2025-04-08 SDOH — ECONOMIC STABILITY: TRANSPORTATION INSECURITY
IN THE PAST 12 MONTHS, HAS LACK OF TRANSPORTATION KEPT YOU FROM MEETINGS, WORK, OR FROM GETTING THINGS NEEDED FOR DAILY LIVING?: NO

## 2025-04-08 ASSESSMENT — ENCOUNTER SYMPTOMS
NAUSEA: 0
COUGH: 0
VOMITING: 0
SHORTNESS OF BREATH: 0
ABDOMINAL PAIN: 0
DIARRHEA: 0
WHEEZING: 0

## 2025-04-08 NOTE — PROGRESS NOTES
attempts have been made to ensure accuracy, certain words or phrases may not be entered as intended.          An electronic signature was used to authenticate this note.    --Maritza Zuniga, DO

## 2025-04-08 NOTE — ASSESSMENT & PLAN NOTE
Patient did not lose as much weight this past month on Adipex however overall he is lost a decent mount of weight on Adipex.  Discussed with him about incorporating at least 15 minutes of physical activity a day.  Did offer Wellbutrin to take to help maintain his weight loss however patient will want to try it on his own he can call the office or contact me via [employer] if he ever decides to go on that medication.  Will have him follow-up in 6 months to restart Adipex if he ever desires in the future.  Prescription of Adipex was sent already to pharmacy patient will contact pharmacy to get the medication if any issues he will contact our office.

## 2025-05-30 ENCOUNTER — OFFICE VISIT (OUTPATIENT)
Dept: PODIATRY | Age: 40
End: 2025-05-30
Payer: COMMERCIAL

## 2025-05-30 VITALS — HEIGHT: 69 IN | BODY MASS INDEX: 27.55 KG/M2 | WEIGHT: 186 LBS

## 2025-05-30 DIAGNOSIS — M72.2 PLANTAR FASCIITIS: ICD-10-CM

## 2025-05-30 DIAGNOSIS — Q66.6 PES VALGUS: Primary | ICD-10-CM

## 2025-05-30 DIAGNOSIS — R26.2 DIFFICULTY WALKING: ICD-10-CM

## 2025-05-30 DIAGNOSIS — L85.3 XEROSIS CUTIS: ICD-10-CM

## 2025-05-30 PROCEDURE — 1036F TOBACCO NON-USER: CPT | Performed by: PODIATRIST

## 2025-05-30 PROCEDURE — 99203 OFFICE O/P NEW LOW 30 MIN: CPT | Performed by: PODIATRIST

## 2025-05-30 PROCEDURE — G8427 DOCREV CUR MEDS BY ELIG CLIN: HCPCS | Performed by: PODIATRIST

## 2025-05-30 PROCEDURE — G8419 CALC BMI OUT NRM PARAM NOF/U: HCPCS | Performed by: PODIATRIST

## 2025-05-30 RX ORDER — AMMONIUM LACTATE 12 G/100G
CREAM TOPICAL
Qty: 385 G | Refills: 4 | Status: SHIPPED | OUTPATIENT
Start: 2025-05-30

## 2025-05-30 NOTE — PROGRESS NOTES
Patient is in today for evaluation of bilateral foot pain. Patient says he wakes up in the mornings and has pain. Patient says he uses either an ace wrap or compression socks and it does help elevate the pain. Pcp is Maritza Zuniga,   Last ov 4/8/25  
Comment: sometimes less    Drug use: Never           Diagnostic studies:    No results found.      Procedures:    None    Exam:  VASCULAR: Pedal pulses palpable bilateral foot.  Capillary refill time brisk bilateral foot.  Hair growth present to both lower extremities  NEUROLOGICAL: Epicritic sensations intact and symmetrical  DERMATOLOGICAL: Chronic xerotic skin changes noted to the heel and arch regions bilaterally.  MUSCULOSKELETAL: Valgus issues noted upon weightbearing.  Mild tenderness noted plantar fascial regions bilaterally.  Mild antalgic gait noted upon evaluation.    Plan Per Assessment  Broderick was seen today for foot pain.    Diagnoses and all orders for this visit:    Pes valgus    Plantar fasciitis    Xerosis cutis  -     ammonium lactate (AMLACTIN) 12 % cream; Apply topically to affected areas daily    Difficulty walking        New patient evaluation and management  We did discuss OTC options with orthotics recommended for purchase.  Patient is to continue wearing his good supportive shoe gear with daily activities.  Prescription given today for topical AmLactin cream to be applied as directed.  Patient will be followed up at a later date for continued evaluation and management.      Seen By:    Christian Rodriguez Jr, DPM    Electronically signed by Christian Rodriguez Jr, DPM on 5/30/2025 at 12:15 PM      This note was created using voice recognition software.  The note was reviewed however may contain grammatical errors.

## 2025-07-03 NOTE — ASSESSMENT & PLAN NOTE
Should to be improving with weight loss.  Can recheck at next visit.         Patient called and he needs a refill on his prescription insulin glargine 100 unit/ml pen-injector and insulin pen needles.  He has 1 left.  Please call him with any questions.